# Patient Record
(demographics unavailable — no encounter records)

---

## 2024-10-29 NOTE — HISTORY OF PRESENT ILLNESS
[FreeTextEntry1] : Patient is here in the office for face to face interview with writer for 3 month follow-up visit.  No medication changes at that time. She is 355 days clean. States she discontinued the Prozac and Trazodone. "I am taking 12 meds for chemo alone." She ran out of the Gabapentin, and she does not want to go back to the doctor who was giving it to her and is requesting writer to prescribe it for her.    Mood: fair. Denies any depression. Anxiety is there but manageable.   Sleeping: No trazodone Gets 4-5 hours of sleep broken.  Appetite: increased. Weight increased from 113 to 119 lbs.   Energy: 50/50 Concentration: beginning to read Motivation: better  Denies any AVH, SI or HI.

## 2024-10-29 NOTE — PHYSICAL EXAM
[None] : none [Cooperative] : cooperative [Depressed] : depressed [Constricted] : constricted [Clear] : clear [Linear/Goal Directed] : linear/goal directed [Average] : average [WNL] : within normal limits [FreeTextEntry8] : better with the Lexapro [de-identified] : dysphoric. better with the Lexapro

## 2024-10-29 NOTE — PLAN
[FreeTextEntry4] : Assessment: Patient is a 67 yo female with h/o depression and anxiety seen today for medication management. Patient is compliant with the medications, tolerating it well without any side effects. I-STOP was checked without any problems.  I-STOP: Patient Name: Izabel Wiley YOB: 1954 Address: [address] [place] [address] [place] [address] Sex: Female Rx Written	Rx Dispensed	Drug	Quantity	Days Supply	Prescriber Name	Prescriber Christin #	Payment Method	Dispenser 09/29/2022	09/29/2022	oxycodone hcl (ir) 5 mg tablet	20	4	Celia Reyes	[id number]	Medicare	[id number] 09/26/2022	09/26/2022	oxycodone hcl (ir) 5 mg tablet	5	1	Nina Strong	[id number]	Medicare	[facility][id number] 09/20/2022	09/20/2022	lorazepam 0.5 mg tablet	10	4	Abhishek Desir	[id number]	Medicare	[id number] 07/29/2022	07/29/2022	lorazepam 0.5 mg tablet	90	25	[facility]	[id number]	Medicare	[id number]   PLAN: Increase Gabapentin 300 mg BID to TID for anxiety, insomnia, neuropathic pain, and alcohol use.  Continue Naltrexone 50 mg PO QD for alcohol use disorder Continue Wellbutrin  mg PO QD for depression, low motivation, energy, concentration and decrease SSRI induced sexual dysfunction. Continue Trazodone 50 mg PO QHS for insomnia Continue Klonopin 0.5 mg PRN for anxiety #10 D/C Lexapro 5 mg PO QAM for depression and anxiety.  D/C Prozac 10 mg PO QD for anxiety and depression.- Discussed risks and benefits of medications including side effects of GI and sexual with SSRI. Alternative strategies including no intervention discussed with patient. Patient consents to current medications as prescribed. - Discussed with patient regarding importance of abstinence and sobriety from alcohol and drugs. Educated about relationship between worsening mood/anxiety symptoms and drug use and improvement of symptoms with abstinence.  - Discussed about unpredictable effects including cardiorespiratory collapse from the combination of illicit drugs and prescribed medications. Patient verbalized understanding. - Patient understands to contact clinic prn with concerns and agrees to call 911 or go to nearest ER if symptoms worsen. - Next appointment made was made in 3 months. Patient left the office without any distress.

## 2024-11-18 NOTE — PHYSICAL EXAM
[No Acute Distress] : no acute distress [Well Nourished] : well nourished [Well Developed] : well developed [Well-Appearing] : well-appearing [PERRL] : pupils equal round and reactive to light [EOMI] : extraocular movements intact [Normal Outer Ear/Nose] : the outer ears and nose were normal in appearance [Normal Oropharynx] : the oropharynx was normal [No JVD] : no jugular venous distention [No Lymphadenopathy] : no lymphadenopathy [Supple] : supple [Thyroid Normal, No Nodules] : the thyroid was normal and there were no nodules present [No Respiratory Distress] : no respiratory distress  [No Accessory Muscle Use] : no accessory muscle use [Clear to Auscultation] : lungs were clear to auscultation bilaterally [Normal Rate] : normal rate  [Regular Rhythm] : with a regular rhythm [Normal S1, S2] : normal S1 and S2 [No Murmur] : no murmur heard [No Carotid Bruits] : no carotid bruits [No Abdominal Bruit] : a ~M bruit was not heard ~T in the abdomen [No Varicosities] : no varicosities [Pedal Pulses Present] : the pedal pulses are present [No Edema] : there was no peripheral edema [No Palpable Aorta] : no palpable aorta [No Extremity Clubbing/Cyanosis] : no extremity clubbing/cyanosis [Soft] : abdomen soft [Non Tender] : non-tender [Non-distended] : non-distended [No Masses] : no abdominal mass palpated [No HSM] : no HSM [Normal Bowel Sounds] : normal bowel sounds [Normal Posterior Cervical Nodes] : no posterior cervical lymphadenopathy [Normal Anterior Cervical Nodes] : no anterior cervical lymphadenopathy [No CVA Tenderness] : no CVA  tenderness [No Spinal Tenderness] : no spinal tenderness [No Joint Swelling] : no joint swelling [Grossly Normal Strength/Tone] : grossly normal strength/tone [No Rash] : no rash [Coordination Grossly Intact] : coordination grossly intact [No Focal Deficits] : no focal deficits [Normal Gait] : normal gait [Deep Tendon Reflexes (DTR)] : deep tendon reflexes were 2+ and symmetric [Normal Affect] : the affect was normal [Normal Insight/Judgement] : insight and judgment were intact [de-identified] : left upper eyelid hordeolum with erythema and swelling

## 2024-11-18 NOTE — HISTORY OF PRESENT ILLNESS
[FreeTextEntry1] : stye, rib fracture [de-identified] : Pt comes in for multiple medical complaints. 2 weeks ago developed left upper eyelid swelling/pain, was doing Erythromycin ointment, then her  added PO Duracef, still not better. No fevers/chills. Thursday she tripped outside and fell into a planter, broke 2 ribs on right side. Taking Tylenol with codeine for pain. Finally, diagnosed with left TMJ, has been getting Botox into joint, feels better. Here for BP check.

## 2024-11-18 NOTE — ASSESSMENT
[FreeTextEntry1] : 1) Rib fracture s/p fall on tylenol with codeine; will not refill offered Narcan since on opioid and BZD patient refusing  2) Left upper eyelid hordeolum s/p erythromycin ointment, duracef po abx; patient requesting Doxycycline. Do not feel comfortable writing this for patient with CLL on chemo, on ppx Bactrim, high risk for c diff; advised f/u with ophthalmology offered tobradex patient refused; accepted tobramycin gtts recommend f/u with ophthalmology may need to have I&D c/w warm compresses at least QID no signs of preseptal cellulitis  3) HTN bp stable 124/62 has toprol and norvasc at home  4) AUD, Anxiety, depression f/u with psych Dr. Gonzalez   5) HCM labs today- wants to do them at outpatient lab refused immunizations

## 2025-01-27 NOTE — PHYSICAL EXAM
[None] : none [Cooperative] : cooperative [Depressed] : depressed [Constricted] : constricted [Clear] : clear [Linear/Goal Directed] : linear/goal directed [Average] : average [WNL] : within normal limits [FreeTextEntry8] : better with the Lexapro [de-identified] : dysphoric. better with the Lexapro

## 2025-01-27 NOTE — PLAN
[FreeTextEntry4] : Assessment: Patient is a 69 yo female with h/o depression and anxiety seen today for medication management. Patient is compliant with the medications, tolerating it well without any side effects. I-STOP was checked without any problems.  I-STOP: Patient Name: Izabel Wiley YOB: 1954 Address: [address] [place] [address] [place] [address] Sex: Female Rx Written	Rx Dispensed	Drug	Quantity	Days Supply	Prescriber Name	Prescriber Christin #	Payment Method	Dispenser 09/29/2022	09/29/2022	oxycodone hcl (ir) 5 mg tablet	20	4	Celia Reyes	[id number]	Medicare	[id number] 09/26/2022	09/26/2022	oxycodone hcl (ir) 5 mg tablet	5	1	Nina Strong	[id number]	Medicare	[id number] 09/20/2022	09/20/2022	lorazepam 0.5 mg tablet	10	4	Abhishek Desir	[id number]	Medicare	[id number] 07/29/2022	07/29/2022	lorazepam 0.5 mg tablet	90	25	Alejandrina Tamika FNP	[id number]	Medicare	[id number]   PLAN: NO ANXIETY COVERAGE Continue Gabapentin 300 mg TID for anxiety, insomnia, neuropathic pain, and alcohol use.  Continue Naltrexone 50 mg PO QD for alcohol use disorder Continue Wellbutrin  mg PO QD for depression, low motivation, energy, concentration and decrease SSRI induced sexual dysfunction. Continue Trazodone 50 mg PO QHS for insomnia Continue Klonopin 0.5 mg PRN for anxiety #10 D/C Lexapro 5 mg PO QAM for depression and anxiety.  D/C Prozac 10 mg PO QD for anxiety and depression.- Discussed risks and benefits of medications including side effects of GI and sexual with SSRI. Alternative strategies including no intervention discussed with patient. Patient consents to current medications as prescribed. - Discussed with patient regarding importance of abstinence and sobriety from alcohol and drugs. Educated about relationship between worsening mood/anxiety symptoms and drug use and improvement of symptoms with abstinence.  - Discussed about unpredictable effects including cardiorespiratory collapse from the combination of illicit drugs and prescribed medications. Patient verbalized understanding. - Patient understands to contact clinic prn with concerns and agrees to call 911 or go to nearest ER if symptoms worsen. - Next appointment made was made in 3 months. Patient left the office without any distress.

## 2025-01-27 NOTE — HISTORY OF PRESENT ILLNESS
[FreeTextEntry1] : Patient is here in the office for face to face interview with writer for 3 month follow-up visit.  At that time Gabapentin was increased form 300 BID to TID. States she does not take the morning dose and takes the medication as needed. States she has a lot of anxiety. Stressors: Medical problems, where she and her  are going to be moving, and commute to [place] for her chemo.   Mood: anxious. No anxiety coverage on board. Did not want to be on any serotonergic meds and states she will just deal with it. Sleeping: No trazodone Gets 4-5 hours of sleep broken.  Appetite: increased. Weight increased from 117 to 119 lbs.   Energy: decreased Concentration: hard to concentrate and read Motivation: decreased Denies any AVH, SI or HI.

## 2025-04-28 NOTE — PLAN
[FreeTextEntry4] : Assessment: Patient is a 67 yo female with h/o depression and anxiety seen today for medication management. Patient is compliant with the medications, tolerating it well without any side effects. I-STOP was checked without any problems.  I-STOP: Patient Name: Izabel Wiley YOB: 1954 Address: [address] [place] [address] [place] [address] Sex: Female Rx Written	Rx Dispensed	Drug	Quantity	Days Supply	Prescriber Name	Prescriber Christin #	Payment Method	Dispenser 09/29/2022	09/29/2022	oxycodone hcl (ir) 5 mg tablet	20	4	Celia Reyes	[id number]	Medicare	[id number] 09/26/2022	09/26/2022	oxycodone hcl (ir) 5 mg tablet	5	1	Nina Strong	[id number]	Medicare	[id number] 09/20/2022	09/20/2022	lorazepam 0.5 mg tablet	10	4	Abhishek Desir	[id number]	Medicare	[facility][id number] 07/29/2022	07/29/2022	lorazepam 0.5 mg tablet	90	25	Miriam Tinocooralani HYMAN	[id number]	Medicare	[facility][id number]   PLAN: NO ANXIETY COVERAGE Increase Gabapentin from 900 to 1800 mg per day for anxiety, insomnia, neuropathic pain, and alcohol use. (600 mg PO TID) Continue Naltrexone 50 mg PO QD for alcohol use disorder Continue Wellbutrin  mg PO QD for depression, low motivation, energy, concentration and decrease SSRI induced sexual dysfunction. Continue Klonopin 0.5 mg PRN for anxiety #10 D/C Trazodone 50 mg PO QHS for insomnia D/C Lexapro 5 mg PO QAM for depression and anxiety.  D/C Prozac 10 mg PO QD for anxiety and depression.- Discussed risks and benefits of medications including side effects of GI and sexual with SSRI. Alternative strategies including no intervention discussed with patient. Patient consents to current medications as prescribed. - Discussed with patient regarding importance of abstinence and sobriety from alcohol and drugs. Educated about relationship between worsening mood/anxiety symptoms and drug use and improvement of symptoms with abstinence.  - Discussed about unpredictable effects including cardiorespiratory collapse from the combination of illicit drugs and prescribed medications. Patient verbalized understanding. - Patient understands to contact clinic prn with concerns and agrees to call 911 or go to nearest ER if symptoms worsen. - Next appointment made was made in 3 months. Patient left the office without any distress.

## 2025-04-28 NOTE — HISTORY OF PRESENT ILLNESS
[FreeTextEntry1] : Patient is here in the office for face to face interview with writer for 3 month follow-up visit.  No medication changes at that time.   States she has a lot of anxiety. Stressors: States she has some anxiety because she is moving but marcial snot know where. Maybe [place]? and maybe [place]?. Medical problems, where she and her  are going to be moving, and commute to [place] for her chemo.   Mood: anxious. No anxiety coverage on board. Did not want to be on any serotonergic meds and states she will just deal with it. Sleeping: No trazodone Gets 4-5 hours of sleep broken.  Appetite: increased. Weight increased from 117 to 119 lbs.   Energy: decreased Concentration: hard to concentrate and read Motivation: decreased Denies any AVH, SI or HI.

## 2025-04-28 NOTE — PHYSICAL EXAM
[None] : none [Cooperative] : cooperative [Depressed] : depressed [Constricted] : constricted [Clear] : clear [Linear/Goal Directed] : linear/goal directed [Average] : average [WNL] : within normal limits [FreeTextEntry8] : better with the Lexapro [de-identified] : dysphoric. better with the Lexapro

## 2025-07-23 NOTE — HISTORY OF PRESENT ILLNESS
[FreeTextEntry1] : Patient is here in the office for face to face interview with writer for 3 month follow-up visit.  At that time Gabapentin 900 was increased to 1800 mg. States it helped with her pain. States today is her birthday and she just does not feel good. She moved from a big house to a small house6 weeks ago. Kitchen is small. She does not like change. Has a lot of anxiety.   Mood: anxious. No anxiety coverage on board. Agreed to take a serotonergic medication. Sleeping: No trazodone Gets 4-5 hours of sleep broken.  Appetite: is good. Weight 119 lbs.   Energy: decreased Concentration: hard to concentrate and read Motivation: decreased Denies any AVH, SI or HI.

## 2025-07-23 NOTE — PHYSICAL EXAM
[None] : none [Cooperative] : cooperative [Depressed] : depressed [Constricted] : constricted [Clear] : clear [Linear/Goal Directed] : linear/goal directed [Average] : average [WNL] : within normal limits [FreeTextEntry8] : better with the Lexapro [de-identified] : dysphoric. better with the Lexapro

## 2025-07-23 NOTE — PLAN
[FreeTextEntry4] : Assessment: Patient is a 67 yo female with h/o depression and anxiety seen today for medication management. Patient is compliant with the medications, tolerating it well without any side effects. I-STOP was checked without any problems.  I-STOP: Patient Name: Izabel Wiley YOB: 1954 Address: [address] [place] [address] [place] [address] Sex: Female Rx Written	Rx Dispensed	Drug	Quantity	Days Supply	Prescriber Name	Prescriber Christin #	Payment Method	Dispenser 09/29/2022	09/29/2022	oxycodone hcl (ir) 5 mg tablet	20	4	Celia Reyes	[id number]	Medicare	[facility][id number] 09/26/2022	09/26/2022	oxycodone hcl (ir) 5 mg tablet	5	1	Nina Strong	[id number]	Medicare	[facility][id number] 09/20/2022	09/20/2022	lorazepam 0.5 mg tablet	10	4	Abhishek Desir	[id number]	Medicare	[facility][id number] 07/29/2022	07/29/2022	lorazepam 0.5 mg tablet	90	25	Jazmín Tinocolani HYMAN	[id number]	Medicare	[facility] #[id number]   PLAN: Start Zoloft 25 mg PO QD for anxiety Continue Gabapentin 1800 mg per day for anxiety, insomnia, neuropathic pain, and alcohol use. (600 mg PO TID) Continue Naltrexone 50 mg PO QD for alcohol use disorder Continue Wellbutrin  mg PO QD for depression, low motivation, energy, concentration and decrease SSRI induced sexual dysfunction. Continue Klonopin 0.5 mg PRN for anxiety #10 D/C Trazodone 50 mg PO QHS for insomnia D/C Lexapro 5 mg PO QAM for depression and anxiety.  D/C Prozac 10 mg PO QD for anxiety and depression.- Discussed risks and benefits of medications including side effects of GI and sexual with SSRI. Alternative strategies including no intervention discussed with patient. Patient consents to current medications as prescribed. - Discussed with patient regarding importance of abstinence and sobriety from alcohol and drugs. Educated about relationship between worsening mood/anxiety symptoms and drug use and improvement of symptoms with abstinence.  - Discussed about unpredictable effects including cardiorespiratory collapse from the combination of illicit drugs and prescribed medications. Patient verbalized understanding. - Patient understands to contact clinic prn with concerns and agrees to call 911 or go to nearest ER if symptoms worsen. - Next appointment made was made in 3 months. Patient left the office without any distress.